# Patient Record
Sex: MALE | Race: WHITE | NOT HISPANIC OR LATINO | Employment: OTHER | URBAN - NONMETROPOLITAN AREA
[De-identification: names, ages, dates, MRNs, and addresses within clinical notes are randomized per-mention and may not be internally consistent; named-entity substitution may affect disease eponyms.]

---

## 2024-10-17 ENCOUNTER — PATIENT ROUNDING (BHMG ONLY) (OUTPATIENT)
Dept: BARIATRICS/WEIGHT MGMT | Facility: CLINIC | Age: 69
End: 2024-10-17
Payer: OTHER GOVERNMENT

## 2024-10-17 ENCOUNTER — OFFICE VISIT (OUTPATIENT)
Dept: BARIATRICS/WEIGHT MGMT | Facility: CLINIC | Age: 69
End: 2024-10-17
Payer: OTHER GOVERNMENT

## 2024-10-17 VITALS
DIASTOLIC BLOOD PRESSURE: 93 MMHG | SYSTOLIC BLOOD PRESSURE: 147 MMHG | HEART RATE: 58 BPM | OXYGEN SATURATION: 98 % | TEMPERATURE: 98.6 F | WEIGHT: 186.4 LBS | BODY MASS INDEX: 28.25 KG/M2 | HEIGHT: 68 IN

## 2024-10-17 DIAGNOSIS — E66.3 OVERWEIGHT (BMI 25.0-29.9): Primary | ICD-10-CM

## 2024-10-17 DIAGNOSIS — E11.9 TYPE 2 DIABETES MELLITUS WITHOUT COMPLICATION, WITHOUT LONG-TERM CURRENT USE OF INSULIN: ICD-10-CM

## 2024-10-17 PROCEDURE — 99203 OFFICE O/P NEW LOW 30 MIN: CPT | Performed by: NURSE PRACTITIONER

## 2024-10-17 RX ORDER — LEVOTHYROXINE SODIUM 75 UG/1
TABLET ORAL
COMMUNITY
Start: 2024-09-28

## 2024-10-17 RX ORDER — ATENOLOL 50 MG/1
TABLET ORAL
COMMUNITY
Start: 2024-09-28

## 2024-10-17 RX ORDER — METFORMIN HCL 500 MG
TABLET, EXTENDED RELEASE 24 HR ORAL
COMMUNITY
Start: 2024-09-28

## 2024-10-17 RX ORDER — ATORVASTATIN CALCIUM 10 MG/1
TABLET, FILM COATED ORAL
COMMUNITY
Start: 2024-09-28

## 2024-10-17 RX ORDER — ESOMEPRAZOLE MAGNESIUM 40 MG/1
CAPSULE, DELAYED RELEASE ORAL
COMMUNITY
Start: 2024-08-30

## 2024-10-17 NOTE — PROGRESS NOTES
Patient rounding for Bariatric Surgery was through Jos Ruiz  Practice Manager  General Surgery/Bariatric Surgery

## 2024-10-21 PROBLEM — E11.69 TYPE 2 DIABETES MELLITUS WITH OBESITY: Status: ACTIVE | Noted: 2024-10-21

## 2024-10-21 PROBLEM — E11.9 TYPE 2 DIABETES MELLITUS WITHOUT COMPLICATION, WITHOUT LONG-TERM CURRENT USE OF INSULIN: Status: ACTIVE | Noted: 2024-10-21

## 2024-10-21 PROBLEM — E66.9 TYPE 2 DIABETES MELLITUS WITH OBESITY: Status: ACTIVE | Noted: 2024-10-21

## 2024-10-21 PROBLEM — E66.3 OVERWEIGHT (BMI 25.0-29.9): Status: ACTIVE | Noted: 2024-10-21

## 2024-10-21 NOTE — PROGRESS NOTES
Patient Care Team:  Obed Navarro MD as PCP - General (General Surgery)      Subjective     History of Present Illness  The patient is a 68-year-old male who presents today with a BMI of 28. He was recently diagnosed with type 2 diabetes greater than 8 and he is here today to discuss some nutritional changes.    He has been diagnosed with type 2 diabetes, which has shown signs of worsening. His daily routine typically involves waking up at 1:00 PM and retiring to bed around 5:00 AM, indicating a nocturnal lifestyle rather than a diurnal one.      Review of Systems   Constitutional:  Positive for fatigue.   Respiratory: Negative.     Cardiovascular: Negative.    Gastrointestinal: Negative.    Endocrine: Negative.    Musculoskeletal: Negative.    Psychiatric/Behavioral:  The patient is nervous/anxious.         History  Past Medical History:   Diagnosis Date    Krishna's esophagus       History reviewed. No pertinent surgical history.   Social History     Socioeconomic History    Marital status: Single      History reviewed. No pertinent family history.   No Known Allergies       Current Outpatient Medications:     atenolol (TENORMIN) 50 MG tablet, , Disp: , Rfl:     atorvastatin (LIPITOR) 10 MG tablet, , Disp: , Rfl:     esomeprazole (nexIUM) 40 MG capsule, , Disp: , Rfl:     Ferrous Sulfate (IRON PO), Take  by mouth., Disp: , Rfl:     fluticasone (VERAMYST) 27.5 MCG/SPRAY nasal spray, Administer 2 sprays into the nostril(s) as directed by provider Daily., Disp: , Rfl:     levothyroxine (SYNTHROID, LEVOTHROID) 75 MCG tablet, , Disp: , Rfl:     metFORMIN ER (GLUCOPHAGE-XR) 500 MG 24 hr tablet, , Disp: , Rfl:     Objective     Vital Signs     Body mass index is 28.76 kg/m².      10/17/24  1349   Weight: 84.6 kg (186 lb 6.4 oz)     Waist Circumference: 42.25  Hip Circunference: 45.5      Physical Exam  Vitals reviewed.   Constitutional:       Appearance: He is obese.   Cardiovascular:      Rate and Rhythm:  Normal rate and regular rhythm.   Pulmonary:      Effort: Pulmonary effort is normal.   Musculoskeletal:         General: Normal range of motion.   Skin:     General: Skin is warm and dry.   Neurological:      Mental Status: He is alert and oriented to person, place, and time.   Psychiatric:         Mood and Affect: Mood normal.         Behavior: Behavior normal.          Physical Exam      Results      I reviewed the patient's new clinical results.      Assessment & Plan     Assessment & Plan  1. Type 2 Diabetes Mellitus.  A dietary plan was discussed to manage his type 2 diabetes, which has worsened. He was advised to switch from skim milk to Fairlife milk, specifically the blue reduced fat variant. For hydration, sugar-free water flavorings like Crystal Light were suggested. He was informed that TV dinners without potatoes or corn are acceptable after his second meal, and those with meat and vegetables are preferable. Healthy Choice options and steamer bags of vegetables were recommended. He can continue consuming chunky chicken and vegetable soup, adding steamer vegetables for his second meal. Fruit consumption should be avoided after the second meal. His first meal can consist of two eggs with bell peppers or onions, which can be purchased frozen, and a quarter cup of egg whites. Yogurt with blueberries and almonds was suggested as a second meal option. For dessert, sugar-free Jell-O or popsicles were recommended. Protein sources like yogurt with less than 9 g of sugar, sausage, and almonds were approved. He was advised to reduce snacking and stick to four set meals a day. His coffee intake can remain at 3 to 4 cups a day, with Fairlife milk. Raisin Bran with Fairlife milk was approved for daily consumption.        Diagnoses and all orders for this visit:    1. Overweight (BMI 25.0-29.9) (Primary)    2. Type 2 diabetes mellitus without complication, without long-term current use of insulin        I discussed  the patient's findings and my recommendations with patient.         He has been provided a structured dietary regimen based off of his behavior.  I discussed with the patient the etiology of the disease of obesity and the potential comorbid conditions associated with this disease.  He was instructed to follow the dietary regimen and follow-up with our program in 1 month's time with any additional questions as they may arise during this time.  We emphasized on focusing on adequate protein and meals high in fiber as well as adequate hydration that exceed 64 ounces of water daily.    I explained that I anticipate the patient to lose weight prior to his next monthly visit.  I encouraged patient to have a reward day once a month.  Patient will begin GREEN meal plan.      CRISTIN Aguilar   10:12 CDT  10/21/24    A total of 30 minutes was spent face to face with this patient and over half of the time was spent on counseling and coordination of care for the disease of diabetes and being overweight. We specifically reviewed the dietary prescription and I made recommendations toward increasing exercise as tolerated as well as focusing on training their behavior toward storing less.  Patient or patient representative verbalized consent for the use of Ambient Listening during the visit with  CRISTIN Aguilar for chart documentation. 10/21/2024  10:11 CDT

## 2024-11-05 NOTE — PROGRESS NOTES
[facility] - PODIATRY    Today's Date: 11/08/2024     Patient Name: Angelo Nunez  MRN: [id number]  CSN: [id number]  PCP: Obed Navarro MD  Referring Provider: Obed Navarro*    SUBJECTIVE     Chief Complaint   Patient presents with    [facility]     Obed Navarro 09/04/2024 new pt, buinions aris    Diabetes     HPI: Angelo Nunez, a 68 y.o.male, comes to clinic as a(n) new patient presenting for diabetic foot exam and complaining of foot pain. Patient has h/o HLD, HTN, GERD, DM 2 . Patient is NIDDM and unsure of last BG level.  Denies significant numbness in his feet.  Denies open wounds or sores.  Relates that he recently moved to the area from [place].  Notes issues with bunion and hammertoe deformities bilateral.  Relates that they occasionally rub in his shoes but overall are mostly asymptomatic and do not cause him significant complications.  Denies issues caring for his toenails.  Admits pain at 2/10 level and described as aching and dull. Denies previous treatment. Denies any constitutional symptoms. No other pedal complaints at this time.    Past Medical History:   Diagnosis Date    Krishna's esophagus      History reviewed. No pertinent surgical history.  History reviewed. No pertinent family history.  Social History     Socioeconomic History    Marital status: Single   Vaping Use    Vaping status: Never Used   Substance and Sexual Activity    Alcohol use: Defer    Drug use: Defer    Sexual activity: Defer     No Known Allergies  Current Outpatient Medications   Medication Sig Dispense Refill    atenolol (TENORMIN) 50 MG tablet       atorvastatin (LIPITOR) 10 MG tablet       esomeprazole (nexIUM) 40 MG capsule       Ferrous Sulfate (IRON PO) Take  by mouth.      fluticasone (VERAMYST) 27.5 MCG/SPRAY nasal spray Administer 2 sprays into the nostril(s) as directed by provider Daily.      levothyroxine (SYNTHROID, LEVOTHROID) 75 MCG tablet       metFORMIN  ER (GLUCOPHAGE-XR) 500 MG 24 hr tablet        No current facility-administered medications for this visit.     Review of Systems   Constitutional:  Negative for chills and fever.   HENT:  Negative for congestion.    Respiratory:  Negative for shortness of breath.    Cardiovascular:  Negative for chest pain and leg swelling.   Gastrointestinal:  Negative for constipation, diarrhea, nausea and vomiting.   Musculoskeletal:         Foot pain   Skin:  Negative for wound.   Neurological:  Negative for numbness.   Psychiatric/Behavioral:  Negative for agitation.        OBJECTIVE     Vitals:    11/08/24 1431   BP: 148/78   Pulse: 80   Resp: 18       PHYSICAL EXAM  GEN:   Accompanied by none.     Foot/Ankle Exam    GENERAL  Diabetic foot exam performed    Appearance:  appears stated age  Orientation:  AAOx3  Affect:  appropriate  Gait:  unimpaired  Assistance:  independent  Right shoe gear: casual shoe  Left shoe gear: casual shoe    VASCULAR     Right Foot Vascularity   Dorsalis pedis:  2+  Posterior tibial:  2+  Skin temperature:  warm  Edema grading:  None  CFT:  3  Pedal hair growth:  Present  Varicosities:  none     Left Foot Vascularity   Dorsalis pedis:  2+  Posterior tibial:  2+  Skin temperature:  warm  Edema grading:  None  CFT:  3  Pedal hair growth:  Present  Varicosities:  none     NEUROLOGIC     Right Foot Neurologic   Light touch sensation: diminished  Vibratory sensation: normal  Hot/Cold sensation: normal  Protective Sensation using [place]-Yamilet Monofilament:   Sites intact: 10  Sites tested: 10     Left Foot Neurologic   Light touch sensation: diminished  Vibratory sensation: normal  Hot/Cold sensation:  normal  Protective Sensation using [place]-Yamilet Monofilament:   Sites intact: 10  Sites tested: 10    MUSCULOSKELETAL     Right Foot Musculoskeletal   Ecchymosis:  none  Tenderness:  MTP 1 dorsal tenderness    Arch:  Normal  Hammertoe:  Second toe, third toe, fourth toe and fifth toe  Hallux valgus:  Yes       Left Foot Musculoskeletal   Ecchymosis:  none  Tenderness:  MTP 1 dorsal tenderness  Arch:  Normal  Hammertoe:  Second toe, third toe, fourth toe and fifth toe  Hallux valgus: Yes      MUSCLE STRENGTH     Right Foot Muscle Strength   Foot dorsiflexion:  5  Foot plantar flexion:  5  Foot inversion:  5  Foot eversion:  5     Left Foot Muscle Strength   Foot dorsiflexion:  5  Foot plantar flexion:  5  Foot inversion:  5  Foot eversion:  5    RANGE OF MOTION     Right Foot Range of Motion   Foot and ankle ROM within normal limits       Left Foot Range of Motion   Foot and ankle ROM within normal limits      DERMATOLOGIC      Right Foot Dermatologic   Skin  Right foot skin is intact.      Left Foot Dermatologic   Skin  Left foot skin is intact.       RADIOLOGY/NUCLEAR:  XR Foot 3+ View Bilateral    Result Date: 11/8/2024  Narrative: EXAMINATION: XR FOOT 3+ VW BILATERAL-  11/8/2024 2:30 PM  HISTORY: foot pain; M20.12-Hallux valgus (acquired), left foot; M20.11-Hallux valgus (acquired), right foot; M20.41-Other hammer toe(s) (acquired), right foot; M20.42-Other hammer toe(s) (acquired), left foot  3 view bilateral foot exam. 6 views total.  RIGHT foot evaluation shows bunion deformity with hallux valgus. First MTP angle = 21 degrees. Moderate osteoarthritic change at the first MTP joint. The metatarsals and toes show no acute fracture. Normal appearance of the midfoot joints. Mild inferior calcaneal spurring. The talus and calcaneus are intact.  LEFT foot evaluation shows similar bunion deformity and hallux valgus. The first MTP angle is 34 degrees. Mild medial subluxation at the first MTP joint. Moderate osteoarthritic spurring is present at the first MTP joint. The metatarsals and toes show no acute fracture. Midfoot joints have a normal appearance. The talus and calcaneus are intact. There is minimal inferior calcaneal spurring.      Impression: 1. Bilateral bunion deformity with hallux valgus. 2. Bilateral  osteoarthritic change greatest at the first MTP joint.    This report was signed and finalized on 11/8/2024 5:15 PM by Dr. Saul Khan MD.       LABORATORY/CULTURE RESULTS:      PATHOLOGY RESULTS:       ASSESSMENT/PLAN     Diagnoses and all orders for this visit:    1. Hallux valgus, left (Primary)  -     XR Foot 3+ View Bilateral; Future    2. Hallux valgus, right  -     XR Foot 3+ View Bilateral; Future    3. Hammer toes of both feet  -     XR Foot 3+ View Bilateral; Future    4. Foot pain, bilateral    5. Type 2 diabetes mellitus with hyperglycemia, without long-term current use of insulin      Comprehensive lower extremity examination and evaluation was performed.  Discussed findings and treatment plan including risks, benefits, and treatment options with patient in detail. Patient agreed with treatment plan.  DFE performed  Reviewed at home diabetic foot care including daily foot checks   Clinically, patient does have bunion and hammertoe deformities but are mostly asymptomatic.  Will order x-rays for further evaluation and have baseline films.  May continue conservative therapy including padding, spacers, and wider shoes.  An After Visit Summary was printed and given to the patient at discharge, including (if requested) any available informative/educational handouts regarding diagnosis, treatment, or medications. All questions were answered to patient/family satisfaction. Should symptoms fail to improve or worsen they agree to call or return to clinic or to go to the Emergency Department. Discussed the importance of following up with any needed screening tests/labs/specialist appointments and any requested follow-up recommended by me today. Importance of maintaining follow-up discussed and patient accepts that missed appointments can delay diagnosis and potentially lead to worsening of conditions.  Return in about 1 year (around 11/8/2025) for Diabetic Foot Exam, Follow-up with Podiatry Provider., or sooner  if acute issues arise.      This document has been electronically signed by Stefano Jimenez DPM on November 11, 2024 17:38 CST

## 2024-11-08 ENCOUNTER — OFFICE VISIT (OUTPATIENT)
Age: 69
End: 2024-11-08
Payer: OTHER GOVERNMENT

## 2024-11-08 ENCOUNTER — HOSPITAL ENCOUNTER (OUTPATIENT)
Dept: GENERAL RADIOLOGY | Facility: HOSPITAL | Age: 69
Discharge: HOME | End: 2024-11-08
Admitting: PODIATRIST
Payer: OTHER GOVERNMENT

## 2024-11-08 VITALS
RESPIRATION RATE: 18 BRPM | BODY MASS INDEX: 27.74 KG/M2 | WEIGHT: 183 LBS | HEART RATE: 80 BPM | SYSTOLIC BLOOD PRESSURE: 148 MMHG | DIASTOLIC BLOOD PRESSURE: 78 MMHG | HEIGHT: 68 IN

## 2024-11-08 DIAGNOSIS — M20.12 HALLUX VALGUS, LEFT: Primary | ICD-10-CM

## 2024-11-08 DIAGNOSIS — M20.42 HAMMER TOES OF BOTH FEET: ICD-10-CM

## 2024-11-08 DIAGNOSIS — M20.41 HAMMER TOES OF BOTH FEET: ICD-10-CM

## 2024-11-08 DIAGNOSIS — M79.671 FOOT PAIN, BILATERAL: ICD-10-CM

## 2024-11-08 DIAGNOSIS — M20.12 HALLUX VALGUS, LEFT: ICD-10-CM

## 2024-11-08 DIAGNOSIS — M79.672 FOOT PAIN, BILATERAL: ICD-10-CM

## 2024-11-08 DIAGNOSIS — E11.65 TYPE 2 DIABETES MELLITUS WITH HYPERGLYCEMIA, WITHOUT LONG-TERM CURRENT USE OF INSULIN: ICD-10-CM

## 2024-11-08 DIAGNOSIS — M20.11 HALLUX VALGUS, RIGHT: ICD-10-CM

## 2024-11-08 PROCEDURE — 73630 X-RAY EXAM OF FOOT: CPT

## 2024-11-08 PROCEDURE — 99203 OFFICE O/P NEW LOW 30 MIN: CPT | Performed by: PODIATRIST

## 2024-11-13 ENCOUNTER — NUTRITION (OUTPATIENT)
Dept: BARIATRICS/WEIGHT MGMT | Facility: CLINIC | Age: 69
End: 2024-11-13
Payer: OTHER GOVERNMENT

## 2024-11-13 VITALS — HEIGHT: 68 IN | BODY MASS INDEX: 28.13 KG/M2 | WEIGHT: 185.6 LBS

## 2024-11-13 DIAGNOSIS — E11.9 TYPE 2 DIABETES MELLITUS WITHOUT COMPLICATION, WITHOUT LONG-TERM CURRENT USE OF INSULIN: Primary | ICD-10-CM

## 2024-11-13 DIAGNOSIS — E66.3 OVERWEIGHT (BMI 25.0-29.9): ICD-10-CM

## 2024-11-13 PROCEDURE — 97802 MEDICAL NUTRITION INDIV IN: CPT

## 2024-11-13 RX ORDER — POLYVINYL ALCOHOL 14 MG/ML
1 SOLUTION/ DROPS OPHTHALMIC
COMMUNITY

## 2025-06-24 ENCOUNTER — TELEPHONE (OUTPATIENT)
Dept: BARIATRICS/WEIGHT MGMT | Facility: CLINIC | Age: 70
End: 2025-06-24
Payer: OTHER GOVERNMENT

## 2025-06-24 NOTE — TELEPHONE ENCOUNTER
"Patient called requesting information for a diet appropriate to \"protect his kidneys.\"     He states the nutritional interventions we recommended Oct/Nov of 2024 helped him successfully lose 20 lbs and gain control of his A1C.  He states that recently his PCP recommended to place him on a medication to \"protect his kidneys\" and he would like to know if there is a diet to do this instead.  I do not have access to review any of his current lab work or last PCP notes at this time.     He states his diet is relatively consistent to what was recommended previously, primarily consuming proteins and vegetables at meals with lower amounts of carbohydrate foods.  From a nutrition standpoint, I recommend maintaining this diet pattern, controlling weight and diabetes, and to aim for adequate protein daily ~ grams/day.     He has lots of questions regarding the meaning of urine albumin vs creatinine ratio and medications.   I provided him a reference to the [employer], as well as recommended he reach out to his primary care physician and pharmacist for further information regarding lab work and medications.   "